# Patient Record
Sex: FEMALE | Race: BLACK OR AFRICAN AMERICAN | NOT HISPANIC OR LATINO | Employment: UNEMPLOYED | URBAN - METROPOLITAN AREA
[De-identification: names, ages, dates, MRNs, and addresses within clinical notes are randomized per-mention and may not be internally consistent; named-entity substitution may affect disease eponyms.]

---

## 2017-01-16 ENCOUNTER — HOSPITAL ENCOUNTER (EMERGENCY)
Facility: HOSPITAL | Age: 22
Discharge: HOME | End: 2017-01-16
Attending: EMERGENCY MEDICINE
Payer: MEDICAID

## 2017-01-16 VITALS
WEIGHT: 120 LBS | SYSTOLIC BLOOD PRESSURE: 112 MMHG | RESPIRATION RATE: 18 BRPM | BODY MASS INDEX: 18.19 KG/M2 | HEIGHT: 68 IN | HEART RATE: 102 BPM | TEMPERATURE: 99 F | OXYGEN SATURATION: 98 % | DIASTOLIC BLOOD PRESSURE: 69 MMHG

## 2017-01-16 DIAGNOSIS — Z34.90 PREGNANCY, UNSPECIFIED GESTATIONAL AGE: Primary | ICD-10-CM

## 2017-01-16 LAB
B-HCG UR QL: POSITIVE
CTP QC/QA: YES

## 2017-01-16 PROCEDURE — 81025 URINE PREGNANCY TEST: CPT | Performed by: EMERGENCY MEDICINE

## 2017-01-16 PROCEDURE — 99283 EMERGENCY DEPT VISIT LOW MDM: CPT | Mod: 25

## 2017-01-16 NOTE — ED AVS SNAPSHOT
[facility]  [address] [place] [address]               Ezra Kim   2017  6:28 PM   ED    Description:  Female : 1995   Department:  [facility]           Your Care was Coordinated By:     Provider Role From To    Karlos Boothe MD Attending Provider 17 1825 --      Reason for Visit     Possible Pregnancy           Diagnoses this Visit        Comments    Pregnancy, unspecified gestational age    -  Primary       ED Disposition     ED Disposition Condition Comment    Discharge             To Do List           Follow-up Information     Schedule an appointment as soon as possible for a visit with Samantha Abraham MD.    Specialty:  Obstetrics and Gynecology    Contact information:    [facility] [address]  [address]  [place] [address]  [phone]          Follow up with [facility].    Specialty:  Emergency Medicine    Why:  As needed, If symptoms worsen    Contact information:    [address] [place] [id number] [phone]       These Medications        Disp Refills Start End    PNV cmb#21-iron-folic acid (PRENATAL COMPLETE)  mg-mcg Tab 30 tablet 10 2017    Take 1 tablet by mouth once daily. - Oral      [facility] On Call     [facility] On Call Nurse Care Line -  Assistance  Registered nurses in the [facility] On Call Center provide clinical advisement, health education, appointment booking, and other advisory services.  Call for this free service at [phone].             Medications           Message regarding Medications     Verify the changes and/or additions to your medication regime listed below are the same as discussed with your clinician today.  If any of these changes or additions are incorrect, please notify your healthcare provider.        START taking these NEW medications        Refills    PNV cmb#21-iron-folic acid (PRENATAL COMPLETE)   "mg-mcg Tab 10    Sig: Take 1 tablet by mouth once daily.    Class: Print    Route: Oral      STOP taking these medications     ondansetron (ZOFRAN-ODT) 8 MG TbDL Take 1 tablet (8 mg total) by mouth every 8 (eight) hours as needed (nausea and vomiting).           Verify that the below list of medications is an accurate representation of the medications you are currently taking.  If none reported, the list may be blank. If incorrect, please contact your healthcare provider. Carry this list with you in case of emergency.           Current Medications     PNV cmb#21-iron-folic acid (PRENATAL COMPLETE)  mg-mcg Tab Take 1 tablet by mouth once daily.           Clinical Reference Information           Your Vitals Were     BP Pulse Temp Resp Height Weight    112/69 (BP Location: Right arm, Patient Position: Sitting) 102 98.7 °F (37.1 °C) (Oral) 18 5' 8" (1.727 m) 54.4 kg (120 lb)    Last Period SpO2 BMI          10/10/2016 98% 18.25 kg/m2        Allergies as of 1/16/2017     No Known Allergies      Immunizations Administered on Date of Encounter - 1/16/2017     None      ED Micro, Lab, POCT     Start Ordered       Status Ordering Provider    01/16/17 1705 01/16/17 1704  POCT urine pregnancy  Once      Final result       ED Imaging Orders     None        Discharge Instructions       Return to the Emergency Department of any acute worsening of your symptoms or for any other concern.     You should return to the ED for fever/chills, shortness of breath, chest pain, weakness or "passing out".     Pt should take all medications as prescribed.    Pt should follow up with PCP as soon as possible.    The risks associated with not taking your medications as prescribed and not following up with your Primary Care doctor or sub specialist includes worsening of your condition, pain, disability, loss of function or livelihood, and death      NIMISHA Boothe M.D. 6:39 PM 1/16/2017        Discharge References/Attachments     " PRENATAL CARE, WHAT IS (ENGLISH)    PREGNANCY: YOUR FIRST TRIMESTER CHANGES (ENGLISH)      [facility] Sign-Up     Activating your [facility] account is as easy as 1-2-3!     1) Visit [email or web address], select Sign Up Now, enter this activation code and your date of birth, then select Next.  [email or web address]  Expires: 3/2/2017  6:40 PM      2) Create a username and password to use when you visit [facility] in the future and select a security question in case you lose your password and select Next.    3) Enter your e-mail address and click Sign Up!    Additional Information  If you have questions, please e-mail [email or web address] or call [phone] to talk to our [facility] staff. Remember, [facility] is NOT to be used for urgent needs. For medical emergencies, dial 911.          [facility] complies with applicable Federal civil rights laws and does not discriminate on the basis of race, color, national origin, age, disability, or sex.        Language Assistance Services     ATTENTION: Language assistance services are available, free of charge. Please call [phone].      ATENCIÓN: Si habla español, tiene a heaton disposición servicios gratuitos de asistencia lingüística. Llame al [phone].     CHÚ Ý: N?u b?n nói Ti?ng Vi?t, có các d?ch v? h? tr? ngôn ng? mi?n phí dành cho b?n. G?i s? [phone].

## 2017-01-17 NOTE — ED TRIAGE NOTES
Want to know if I am pregnant and my vagina hurts one month and i have slight nausea denies vomiting or diarrhea admits dysuria

## 2017-01-17 NOTE — DISCHARGE INSTRUCTIONS
"Return to the Emergency Department of any acute worsening of your symptoms or for any other concern.     You should return to the ED for fever/chills, shortness of breath, chest pain, weakness or "passing out".     Pt should take all medications as prescribed.    Pt should follow up with PCP as soon as possible.    The risks associated with not taking your medications as prescribed and not following up with your Primary Care doctor or sub specialist includes worsening of your condition, pain, disability, loss of function or livelihood, and death      NIMISHA Boothe M.D. 6:39 PM 1/16/2017      "

## 2017-01-17 NOTE — ED PROVIDER NOTES
"Encounter Date: 1/16/2017    SCRIBE #1 NOTE: I, Callie Thurstonter, am scribing for, and in the presence of,  Karlos Boothe MD. I have scribed the following portions of the note - Other sections scribed: HPI/ROS.       History     Chief Complaint   Patient presents with    Possible Pregnancy     "I want to see if I'm pregnant and how far along I am."     Review of patient's allergies indicates:  No Known Allergies  HPI Comments: CC: Possible Pregnancy     HPI: This 20 yo F who has no pertinent PMHx presents to the ED for evaluation for possible pregnancy. The pt reports nausea for 1 month. The pt notes that her LMP was approximately October 10, 2016. The pt denies fever, chills, vomiting, diarrhea, and any other associated symptoms. No alleviating or exacerbating factors reported.     The history is provided by the patient. No  was used.     History reviewed. No pertinent past medical history.  Past Medical History Pertinent Negatives   Diagnosis Date Noted    Arthritis 1/16/2017    Asthma 1/16/2017    Cancer 1/16/2017    CHF (congestive heart failure) 1/16/2017    Depression 1/16/2017    Diabetes mellitus 1/16/2017    Encounter for blood transfusion 1/16/2017    GERD (gastroesophageal reflux disease) 1/16/2017    Hypertension 1/16/2017     History reviewed. No pertinent past surgical history.  History reviewed. No pertinent family history.  Social History   Substance Use Topics    Smoking status: Never Smoker    Smokeless tobacco: None    Alcohol use No     Review of Systems   Constitutional: Negative for chills and fever.   HENT: Negative for sore throat.    Eyes: Negative for visual disturbance.   Respiratory: Negative for cough and shortness of breath.    Cardiovascular: Negative for chest pain.   Gastrointestinal: Positive for nausea. Negative for abdominal pain, diarrhea and vomiting.   Genitourinary: Negative for decreased urine volume, difficulty urinating, dyspareunia, " dysuria, enuresis, flank pain, frequency, genital sores, pelvic pain, urgency, vaginal bleeding, vaginal discharge and vaginal pain.   Musculoskeletal: Negative for back pain and myalgias.   Skin: Negative for rash.   Neurological: Negative for weakness and headaches.   Hematological: Does not bruise/bleed easily.       Physical Exam   Initial Vitals   BP Pulse Resp Temp SpO2   01/16/17 1646 01/16/17 1646 01/16/17 1646 01/16/17 1646 01/16/17 1646   112/69 102 18 98.7 °F (37.1 °C) 98 %     Physical Exam    Vitals reviewed.  Constitutional: She appears well-developed and well-nourished.   HENT:   Head: Normocephalic and atraumatic.   Nose: Nose normal.   Mouth/Throat: No oropharyngeal exudate.   Eyes: EOM are normal. Pupils are equal, round, and reactive to light.   Neck: Normal range of motion. Neck supple. No JVD present.   Cardiovascular: Regular rhythm and normal heart sounds. Exam reveals no gallop and no friction rub.    No murmur heard.  Pulmonary/Chest: Breath sounds normal. No stridor. No respiratory distress. She has no wheezes. She has no rhonchi. She has no rales. She exhibits no tenderness.   Abdominal: Soft. Bowel sounds are normal. She exhibits no distension and no mass. There is no tenderness. There is no rebound and no guarding.   Musculoskeletal: Normal range of motion. She exhibits no edema or tenderness.   Neurological: She is alert and oriented to person, place, and time. She has normal strength. No sensory deficit.   Skin: Skin is warm and dry.   Psychiatric: She has a normal mood and affect. Thought content normal.         ED Course   US - ED Performed - OB Limited 1 or More Gestations  Date/Time: 1/20/2017 7:43 AM  Performed by: DAPHNE PHILLIP  Authorized by: DAPHNE PHILLIP   Comments: Transabdominal ultrasound performed, with a curvilinear probe.  Patient had a mildly distended bladder. There were no ultrasound findings for an ectopic pregnancy on my bedside evaluation.  There was  no extrauterine embryo with cardiac activity visualized.  There was no adnexal fluid appreciated.  There was no apparent embryo.  There is no tubal ring.  There was no complex adnexal mass.  I did not detect any significant adnexal abnormality or free fluid.  There was an advanced intrauterine pregnancy identified with recognizable cardiac activity. Heart Rate was approximately 160 beats per minute.          Labs Reviewed   POCT URINE PREGNANCY - Abnormal; Notable for the following:        Result Value    POC Preg Test, Ur Positive (*)     All other components within normal limits            Medical decision-making:    The patient received a medical screening exam. If performed, the EKG was independently evaluated by me and is pending final cardiology evaluation.  If performed, all radiographic studies were independently evaluated by me and are pending final radiology evaluation. If labs were ordered, they were reviewed. Vital signs are independently assessed by me.  If performed, the pulse oximetry was independently evaluated by me.  I decided to obtain the patient's past medical record.  If available, I reviewed the patient's past medical record, including most recent labs and radiology reports.    Pt presents wanting to know if she is pregnant. Pt is unsure of LMP, but thinks it was sometime in October. UPT is positive. Bedside US shows advance IUP with good cardiac activity. Expectant management and OB referral. Start prenatal vit.   No other symptoms of note.    The results and physical exam findings were reviewed with the patient. Pt agrees with assessment, disposition and treatment plan and has no further questions or complaints at this time.    NIMISHA Boothe M.D. 7:46 AM 1/20/2017                 Scribe Attestation:   Scribe #1: I performed the above scribed service and the documentation accurately describes the services I performed. I attest to the accuracy of the note.    Attending Attestation:            Physician Attestation for Scribe:  Physician Attestation Statement for Scribe #1: I, Karlos Boothe MD, reviewed documentation, as scribed by Callie Haro in my presence, and it is both accurate and complete.                 ED Course     Clinical Impression:   The encounter diagnosis was Pregnancy, unspecified gestational age.          Karlos Boothe MD  01/20/17 0746

## 2017-06-20 ENCOUNTER — HOSPITAL ENCOUNTER (OUTPATIENT)
Facility: HOSPITAL | Age: 22
Discharge: HOME | End: 2017-06-21
Attending: OBSTETRICS & GYNECOLOGY | Admitting: OBSTETRICS & GYNECOLOGY
Payer: MEDICAID

## 2017-06-20 DIAGNOSIS — Z34.90 PREGNANT: Primary | ICD-10-CM

## 2017-06-20 PROCEDURE — 81000 URINALYSIS NONAUTO W/SCOPE: CPT

## 2017-06-20 RX ORDER — ONDANSETRON 8 MG/1
8 TABLET, ORALLY DISINTEGRATING ORAL EVERY 8 HOURS PRN
Status: DISCONTINUED | OUTPATIENT
Start: 2017-06-21 | End: 2017-06-21 | Stop reason: HOSPADM

## 2017-06-20 RX ORDER — ACETAMINOPHEN 500 MG
500 TABLET ORAL EVERY 6 HOURS PRN
Status: DISCONTINUED | OUTPATIENT
Start: 2017-06-21 | End: 2017-06-21 | Stop reason: HOSPADM

## 2017-06-20 RX ORDER — DEXTROSE, SODIUM CHLORIDE, SODIUM LACTATE, POTASSIUM CHLORIDE, AND CALCIUM CHLORIDE 5; .6; .31; .03; .02 G/100ML; G/100ML; G/100ML; G/100ML; G/100ML
INJECTION, SOLUTION INTRAVENOUS CONTINUOUS
Status: DISCONTINUED | OUTPATIENT
Start: 2017-06-21 | End: 2017-06-21

## 2017-06-21 VITALS
WEIGHT: 148 LBS | HEART RATE: 121 BPM | DIASTOLIC BLOOD PRESSURE: 53 MMHG | RESPIRATION RATE: 16 BRPM | SYSTOLIC BLOOD PRESSURE: 88 MMHG | HEIGHT: 68 IN | BODY MASS INDEX: 22.43 KG/M2 | TEMPERATURE: 100 F

## 2017-06-21 LAB
BACTERIA #/AREA URNS HPF: ABNORMAL /HPF
BILIRUB UR QL STRIP: NEGATIVE
CLARITY UR: ABNORMAL
COLOR UR: ABNORMAL
GLUCOSE UR QL STRIP: NEGATIVE
HGB UR QL STRIP: NEGATIVE
HYALINE CASTS #/AREA URNS LPF: 0 /LPF
KETONES UR QL STRIP: ABNORMAL
LEUKOCYTE ESTERASE UR QL STRIP: ABNORMAL
MICROSCOPIC COMMENT: ABNORMAL
NITRITE UR QL STRIP: NEGATIVE
NON-SQ EPI CELLS #/AREA URNS HPF: 5 /HPF
PH UR STRIP: 5 [PH] (ref 5–8)
PROT UR QL STRIP: ABNORMAL
RBC #/AREA URNS HPF: 0 /HPF (ref 0–4)
SP GR UR STRIP: 1.02 (ref 1–1.03)
SQUAMOUS #/AREA URNS HPF: 25 /HPF
URN SPEC COLLECT METH UR: ABNORMAL
UROBILINOGEN UR STRIP-ACNC: NEGATIVE EU/DL
WBC #/AREA URNS HPF: 49 /HPF (ref 0–5)
WBC CLUMPS URNS QL MICRO: ABNORMAL

## 2017-06-21 PROCEDURE — 63600175 PHARM REV CODE 636 W HCPCS: Performed by: OBSTETRICS & GYNECOLOGY

## 2017-06-21 PROCEDURE — 25000003 PHARM REV CODE 250: Performed by: OBSTETRICS & GYNECOLOGY

## 2017-06-21 RX ORDER — SODIUM CHLORIDE, SODIUM LACTATE, POTASSIUM CHLORIDE, CALCIUM CHLORIDE 600; 310; 30; 20 MG/100ML; MG/100ML; MG/100ML; MG/100ML
INJECTION, SOLUTION INTRAVENOUS CONTINUOUS
Status: DISCONTINUED | OUTPATIENT
Start: 2017-06-21 | End: 2017-06-21 | Stop reason: HOSPADM

## 2017-06-21 RX ORDER — ONDANSETRON 2 MG/ML
4 INJECTION INTRAMUSCULAR; INTRAVENOUS EVERY 6 HOURS PRN
Status: DISCONTINUED | OUTPATIENT
Start: 2017-06-21 | End: 2017-06-21 | Stop reason: HOSPADM

## 2017-06-21 RX ORDER — PROMETHAZINE HYDROCHLORIDE 25 MG/1
25 TABLET ORAL EVERY 6 HOURS PRN
Qty: 15 TABLET | Refills: 0 | Status: SHIPPED | OUTPATIENT
Start: 2017-06-21 | End: 2017-07-21

## 2017-06-21 RX ADMIN — ONDANSETRON 4 MG: 2 INJECTION INTRAMUSCULAR; INTRAVENOUS at 04:06

## 2017-06-21 RX ADMIN — PROMETHAZINE HYDROCHLORIDE 12.5 MG: 25 INJECTION INTRAMUSCULAR; INTRAVENOUS at 12:06

## 2017-06-21 RX ADMIN — SODIUM CHLORIDE, SODIUM LACTATE, POTASSIUM CHLORIDE, AND CALCIUM CHLORIDE: .6; .31; .03; .02 INJECTION, SOLUTION INTRAVENOUS at 04:06

## 2017-06-21 NOTE — H&P
Obstetrics      SUBJECTIVE:     Ezra Kim is a 21 y.o.  female at 36w6d admitted for n/v/d that began yesterday. She has received IV hydration and is feeling better this morning.  She reports +appetite and thirst this morning.  She denies any abdominal pain.    Patient Active Problem List   Diagnosis    Pregnant         PTA Medications   Medication Sig    ferrous gluconate (FERGON) 324 MG tablet Take 1 tablet (324 mg total) by mouth daily with breakfast.    PNV cmb#21-iron-folic acid (PRENATAL COMPLETE)  mg-mcg Tab Take 1 tablet by mouth once daily.       Review of patient's allergies indicates:  No Known Allergies     No past medical history on file.  No past surgical history on file.  No family history on file.  Social History   Substance Use Topics    Smoking status: Never Smoker    Smokeless tobacco: Not on file    Alcohol use No        OBJECTIVE:     Vital Signs (Most Recent):  Temp: 99.5 °F (37.5 °C) (17 0700)  Pulse: 110 (17 0632)  Resp: 16 (17 0602)  BP: 106/60 (17 0632)    Vital Signs Range (Last 24H):  Temp:  [98.5 °F (36.9 °C)-99.5 °F (37.5 °C)]   Pulse:  []   Resp:  [16-20]   BP: ()/(55-89)       Physical Exam:  General:  Normal, alert and oriented   Abdomen: Gravid soft, nontender   Uterine Size:  c/w dates   FHT: Reative, irregular contractions   Cervix:     Dilation: deferred    Effacement:     Station:                 Laboratory:  See results console  ASSESSMENT/PLAN:     36w6d gestation.  Gastroenteritis - will try clears this morning.  If tolerates, will discharge home with rx for phenergan.

## 2017-06-21 NOTE — DISCHARGE INSTRUCTIONS
Home Undelivered Discharge Instructions    After Discharge Orders:    Future Appointments  Date Time Provider Department Center   6/21/2017 2:20 PM Bharti Uribe MD [facility]       Call physician or midwife's office for instructions.    Current Discharge Medication List      START taking these medications    Details   promethazine (PHENERGAN) 25 MG tablet Take 1 tablet (25 mg total) by mouth every 6 (six) hours as needed for Nausea.  Qty: 15 tablet, Refills: 0         CONTINUE these medications which have NOT CHANGED    Details   ferrous gluconate (FERGON) 324 MG tablet Take 1 tablet (324 mg total) by mouth daily with breakfast.  Qty: 30 tablet, Refills: 5    Associated Diagnoses: Other normal pregnancy, not first, third trimester      PNV cmb#21-iron-folic acid (PRENATAL COMPLETE)  mg-mcg Tab Take 1 tablet by mouth once daily.  Qty: 30 tablet, Refills: 10                     · Diet:  normal diet as tolerated    · Rest: normal activity as tolerated    Other instructions: Do kick counts once a day on your baby. Choose the time of day your baby is most active. Get in a comfortable lying or sitting position and time how long it takes to feel 10 kicks, twists, turns, swishes, or rolls. Call your physician or midwife if there have not been 10 kicks in 1 hours    Call physician or midwife, return to Labor and Delivery, call 911, or go to the nearest Emergency Room if: increased leakage or fluid, contractions more than  10 per  1 hour, decreased fetal movement, persistent low back pain or cramping, bleeding from vaginal area, difficulty urinating, pain with urination, difficulty breathing, new calf pain, persistent headache or vision change